# Patient Record
Sex: MALE | Race: ASIAN | Employment: FULL TIME | URBAN - METROPOLITAN AREA
[De-identification: names, ages, dates, MRNs, and addresses within clinical notes are randomized per-mention and may not be internally consistent; named-entity substitution may affect disease eponyms.]

---

## 2021-07-31 ENCOUNTER — OFFICE VISIT (OUTPATIENT)
Dept: URGENT CARE | Facility: URGENT CARE | Age: 31
End: 2021-07-31
Payer: COMMERCIAL

## 2021-07-31 VITALS
TEMPERATURE: 97.9 F | BODY MASS INDEX: 27.26 KG/M2 | WEIGHT: 163.8 LBS | SYSTOLIC BLOOD PRESSURE: 126 MMHG | DIASTOLIC BLOOD PRESSURE: 80 MMHG | OXYGEN SATURATION: 97 % | RESPIRATION RATE: 20 BRPM | HEART RATE: 81 BPM

## 2021-07-31 DIAGNOSIS — G43.009 MIGRAINE WITHOUT AURA AND WITHOUT STATUS MIGRAINOSUS, NOT INTRACTABLE: Primary | ICD-10-CM

## 2021-07-31 PROCEDURE — 99203 OFFICE O/P NEW LOW 30 MIN: CPT | Performed by: FAMILY MEDICINE

## 2021-07-31 ASSESSMENT — PAIN SCALES - GENERAL: PAINLEVEL: NO PAIN (0)

## 2021-07-31 NOTE — NURSING NOTE
Visual Acuity: Without Glasses      Both Eyes: 20/20    Left Eye: 20/25, missed one letter on the same line     Right Eye: 2020    Rhett Viramontes, CMA

## 2021-07-31 NOTE — PATIENT INSTRUCTIONS
Patient Education     About Migraine Headaches  What is a migraine headache?  A migraine is a special kind of headache. It can last a for hours or days. It may cause intense pain. You may also feel sick to your stomach or have eye problems.  What causes it?  We don't know the exact cause. They may be caused by a problem with blood flow to the brain. Or they may happen when brain chemicals don't stay balanced. You are more likely to get a migraine when:    You are under stress    You are tired    You eat some kinds of foods, such as wine, cheese, chocolate or chemicals added to foods    You are around bright lights  Women are more likely to have migraines than men. Sometimes the headaches happen around the time a woman has her period. Or they may happen when a woman is taking hormone pills.   Migraines can run in families.  What are symptoms?  Before a migraine, you may:    Not feel well    Lose part of your vision    See bright spots    See zigzags in front of your eyes  Most of the time, these problems go away when the headache starts. When you have a migraine, you may:    Have a headache that throbs or pounds (you may feel the pain more on one side of your head, or your whole head may hurt)    Be very sensitive to light    Have blurry vision    Vomit (throw up) or have nausea (feel sick to your stomach)    Have numbness or tingling in your face or arm  How is it diagnosed?  Your care team will ask you about your symptoms and medical history. They will examine you.   It may help to keep a headache diary. You should write down:    The date and time of each headache    How long it lasts    The type of pain (is it dull or sharp? Does it throb? Do you feel pressure?)    Where it hurts (for example, scalp, eyes, temples, neck)    What symptoms you had before the headache started    What you ate and drank before the headache    If you used cigarettes, caffeine, alcohol or soda    What time you went to bed the night  before, and what time you woke up that day    If you are a woman, you should also note if you are using birth control pills or taking other female hormones  If you just recently started having headaches, your care team may suggest tests to look for the causes of your symptoms. You may need a brain scan or MRI.  How is it treated?    You may need to take medicines to keep migraines from getting worse once they start. Take these medicines as soon as you can when you start to have signs of a migraine.    You may need to take another medicine every day to stop migraines from coming so often. The medicine can help prevent very bad migraines.    You may need to try a medicine for a few weeks to see if it works. Be sure to keep your follow-up appointments with your care team to see if a medicine is working and what you should try next. Talk to your care team about what is best for you. You may have many choices.  How long will it last?  The headache may last from a few hours to a few days. You may get migraines for the rest of your life. Most of the time, migraines happen less often as you get older.  How can I take care of myself?  As soon as the migraine starts:    Take a pain reliever. Ask your care team what medicine you should take.    Rest in a quiet, dark room until you start to feel better.    Don't drive a car while you have a migraine.    See your care team if your headaches get worse or if they don't get better when you take medicine for them. It may take a few visits to find the best way to control your headaches.  How can I prevent migraines?    Eat regular, healthy meals. Don't go too long without eating. Stay away from foods that seem to cause headaches. Watch out for:  ? Wine, alma and beer  ? Cheese  ? Aged, canned and processed meats  ? Bread made with yeast  ? Foods with cheese, chocolate or nuts    Don't use medicines that can cause headaches. Ask your care team about this. You may need to stop using  birth control or hormone pills.    Don't smoke cigarettes    Get plenty of sleep every day    Lower your stress. Find time to relax, rest and have fun in your life.  When should I call my care team?  Call your care team right away if you have symptoms that you don't usually get with migraines or you have other unusual symptoms, such as:    Problems talking or your speech is slurred    A weak arm or leg that you can't move in a normal way    A fever higher than 100 F (37.8 C)    Stiff neck    Vomiting that lasts for a few hours  For more information  [employer] (NHF)  www.headaches.org.  For informational purposes only. Not to replace the advice of your health care provider.   Copyright   2011 [employer]. All rights reserved. Clinically reviewed by [facility]. Thuuz [id number] - 08/18.

## 2021-08-02 NOTE — PROGRESS NOTES
SUBJECTIVE:  Geeta Nava, a 31 year old male scheduled an appointment to discuss the following issues:  Migraine without aura and without status migrainosus, not intractable    Medical, social, surgical, and family histories reviewed.     Eye Problem (blurred vision only last night for about 10 minutes, headache sometimes and can't focus, no headache currently, no blurred vision currently )   Headache     Patient's wife is currently 38 weeks pregnant, patient has a 19-month-old child, he has been getting only 2 to 3 hours sleep per night.  For the first time patient has experienced headache yesterday; while cleaning the house, he felt blurry vision with flashing light 5 to 10 seconds in his left eye; he was light sensitive but no photophobia. There was pressure in the front of his head and he was having difficulty focusing with some lightheadedness.  No nausea or vomiting, no syncope.  No trauma to the head or neck. No paresthesia or neurological deficits.  Patient is a non-smoker. He drinks alcohol frequently, had scotch (40% alcohol) 1 week ago.  Takes coffee 2 cups per day; no pop soda.      ROS:  See HPI.  No nausea/vomiting.  No fever/chills.  No chest pain/SOB.  No BM/urine problems.  No syncope.      OBJECTIVE:  /80 (BP Location: Left arm, Patient Position: Sitting, Cuff Size: Adult Regular)   Pulse 81   Temp 97.9  F (36.6  C) (Tympanic)   Resp 20   Wt 74.3 kg (163 lb 12.8 oz)   SpO2 97%   BMI 27.26 kg/m    EXAM:  GENERAL APPEARANCE: alert and mild distress; afebrile, no meningeal signs; no cyanosis or accessory muscle use, GCS 15  EYES: Eyes grossly normal to inspection, PERRL and conjunctivae and sclerae normal; normal EOM  HENT: ear canals and TM's normal and nose and mouth without ulcers or lesions  NECK: no adenopathy, no asymmetry, masses, or scars and thyroid normal to palpation  RESP: lungs clear to auscultation - no rales, rhonchi or wheezes  CV: regular rates and rhythm, normal S1 S2, no  S3 or S4 and no murmur, click or rub  LYMPHATICS: no cervical adenopathy  ABDOMEN: soft, nontender, without hepatosplenomegaly or masses and bowel sounds normal  MS: extremities normal- no gross deformities noted  SKIN: no suspicious lesions or rashes  NEURO: Normal strength and tone, mentation intact and speech normal; no focal neurological deficits; no facial asymmetry or pronator drift, no tremor; normal gait    ASSESSMENT/PLAN:  (G43.009) Migraine with aura and without status migrainosus, not intractable  (primary encounter diagnosis)  Comments:  No evidence of neurological deficits  Plan: Ibuprofen/Acetaminophen PRN headache.  Quit alcohol and caffeine.  Get plenty of rest, exercise regularly.  Keep hydrated with adequate fluid intake.  Pt to f/up PCP within 1 week if no improvement or new problems arise.  Warning signs and symptoms explained---be seen ASAP if worsening.

## 2021-09-26 ENCOUNTER — HEALTH MAINTENANCE LETTER (OUTPATIENT)
Age: 31
End: 2021-09-26

## 2023-04-23 ENCOUNTER — HEALTH MAINTENANCE LETTER (OUTPATIENT)
Age: 33
End: 2023-04-23

## 2024-06-29 ENCOUNTER — HEALTH MAINTENANCE LETTER (OUTPATIENT)
Age: 34
End: 2024-06-29